# Patient Record
Sex: MALE | URBAN - METROPOLITAN AREA
[De-identification: names, ages, dates, MRNs, and addresses within clinical notes are randomized per-mention and may not be internally consistent; named-entity substitution may affect disease eponyms.]

---

## 2022-05-27 ENCOUNTER — APPOINTMENT (RX ONLY)
Dept: URBAN - METROPOLITAN AREA CLINIC 164 | Facility: CLINIC | Age: 67
Setting detail: DERMATOLOGY
End: 2022-05-27

## 2022-05-27 DIAGNOSIS — L82.1 OTHER SEBORRHEIC KERATOSIS: ICD-10-CM

## 2022-05-27 DIAGNOSIS — D18.0 HEMANGIOMA: ICD-10-CM

## 2022-05-27 DIAGNOSIS — D22 MELANOCYTIC NEVI: ICD-10-CM

## 2022-05-27 DIAGNOSIS — L57.0 ACTINIC KERATOSIS: ICD-10-CM

## 2022-05-27 DIAGNOSIS — L81.4 OTHER MELANIN HYPERPIGMENTATION: ICD-10-CM

## 2022-05-27 DIAGNOSIS — L57.8 OTHER SKIN CHANGES DUE TO CHRONIC EXPOSURE TO NONIONIZING RADIATION: ICD-10-CM

## 2022-05-27 PROBLEM — D18.01 HEMANGIOMA OF SKIN AND SUBCUTANEOUS TISSUE: Status: ACTIVE | Noted: 2022-05-27

## 2022-05-27 PROBLEM — D22.5 MELANOCYTIC NEVI OF TRUNK: Status: ACTIVE | Noted: 2022-05-27

## 2022-05-27 PROBLEM — D48.5 NEOPLASM OF UNCERTAIN BEHAVIOR OF SKIN: Status: ACTIVE | Noted: 2022-05-27

## 2022-05-27 PROCEDURE — 17000 DESTRUCT PREMALG LESION: CPT | Mod: 59

## 2022-05-27 PROCEDURE — ? FULL BODY SKIN EXAM

## 2022-05-27 PROCEDURE — 17003 DESTRUCT PREMALG LES 2-14: CPT

## 2022-05-27 PROCEDURE — ? COUNSELING

## 2022-05-27 PROCEDURE — 99203 OFFICE O/P NEW LOW 30 MIN: CPT | Mod: 25

## 2022-05-27 PROCEDURE — 11102 TANGNTL BX SKIN SINGLE LES: CPT

## 2022-05-27 PROCEDURE — ? BIOPSY BY SHAVE METHOD

## 2022-05-27 PROCEDURE — ? LIQUID NITROGEN

## 2022-05-27 PROCEDURE — ? SUNSCREEN RECOMMENDATIONS

## 2022-05-27 ASSESSMENT — LOCATION SIMPLE DESCRIPTION DERM
LOCATION SIMPLE: LOWER BACK
LOCATION SIMPLE: RIGHT LOWER BACK
LOCATION SIMPLE: RIGHT EAR

## 2022-05-27 ASSESSMENT — LOCATION DETAILED DESCRIPTION DERM
LOCATION DETAILED: SUPERIOR LUMBAR SPINE
LOCATION DETAILED: RIGHT SUPERIOR MEDIAL MIDBACK
LOCATION DETAILED: RIGHT SUPERIOR CRUS OF ANTIHELIX
LOCATION DETAILED: RIGHT SUPERIOR HELIX

## 2022-05-27 ASSESSMENT — LOCATION ZONE DERM
LOCATION ZONE: TRUNK
LOCATION ZONE: EAR

## 2022-05-27 NOTE — PROCEDURE: SUNSCREEN RECOMMENDATIONS
[facility]  Critical Care - Medicine  Consult Note    Patient Name: Zhao Fischer  MRN: [id number]  Admission Date: 3/21/2020  Hospital Length of Stay: 2 days  Code Status: Full Code  Attending Physician: Rafaela Romero*  Primary Care Provider: Mannie Russell MD   Principal Problem: Pneumonia due to Severe Acute Respiratory Syndrome Coronavirus 2 (SARS-Cov-2)    Consults  Subjective:     Interval History:  Overnight the patient remained on fentanyl and propofol.  He remains on similar ventilator settings, but mildly increased.  He is having worsening renal function.  He is COVID +.  He was also hypoglycemic overnight, presumably secondary to hydroxychloroquine.  He is tolerating tube feeds at this time.    Past Medical History:   Diagnosis Date    Diabetes mellitus, type 2     Hyperlipidemia     Hypertension     Peripheral artery disease        Past Surgical History:   Procedure Laterality Date    ANGIOPLASTY      CARDIAC CATHETERIZATION         Review of patient's allergies indicates:   Allergen Reactions    Clindamycin Diarrhea       Family History     Problem Relation (Age of Onset)    Hypertension Father    No Known Problems Mother    Stroke Father        Tobacco Use    Smoking status: Former Smoker   Substance and Sexual Activity    Alcohol use: Yes     Comment: social    Drug use: Not on file    Sexual activity: Not on file      Review of Systems  Objective:     Vital Signs (Most Recent):  Temp: 97.5 °F (36.4 °C) (03/25/20 1215)  Pulse: 94 (03/25/20 1330)  Resp: (!) 42 (03/25/20 1330)  BP: 115/70 (03/25/20 1330)  SpO2: (!) 94 % (03/25/20 1330) Vital Signs (24h Range):  Temp:  [96.2 °F (35.7 °C)-98.8 °F (37.1 °C)] 97.5 °F (36.4 °C)  Pulse:  [] 94  Resp:  [18-47] 42  SpO2:  [88 %-97 %] 94 %  BP: ()/(54-79) 115/70     Weight: 73.9 kg (162 lb 14.7 oz)  Body mass index is 24.77 kg/m².      Intake/Output Summary (Last 24 hours) at 3/25/2020 1509  Last data filed at 
3/25/2020 1300  Gross per 24 hour   Intake 1849.21 ml   Output 281 ml   Net 1568.21 ml       Physical Exam   Constitutional: He is oriented to person, place, and time. He appears well-developed and well-nourished. No distress.   HENT:   Head: Normocephalic and atraumatic.   Mouth/Throat: No oropharyngeal exudate.   Eyes: Pupils are equal, round, and reactive to light. No scleral icterus.   Neck: Normal range of motion. Neck supple.   Cardiovascular: Normal rate and regular rhythm. Exam reveals no gallop and no friction rub.   No murmur heard.  Pulmonary/Chest: He is in respiratory distress. He has no wheezes. He has rales. He exhibits no tenderness.   Increased WOB, diffuse crackles present bilaterally   Abdominal: Soft. Bowel sounds are normal. He exhibits no distension. There is no tenderness.   Musculoskeletal: Normal range of motion. He exhibits no edema.   Neurological: He is alert and oriented to person, place, and time. No cranial nerve deficit.   Skin: Skin is warm and dry. He is not diaphoretic.   Psychiatric: He has a normal mood and affect. His behavior is normal. Judgment and thought content normal.       Vents:  Vent Mode: A/C (03/25/20 1020)  Set Rate: 18 BPM (03/25/20 1020)  Vt Set: 420 mL (03/25/20 1020)  Pressure Support: 0 cmH20 (03/25/20 1020)  PEEP/CPAP: 10 cmH20 (03/25/20 1020)  Oxygen Concentration (%): 65 (03/25/20 1330)  Peak Airway Pressure: 23 cmH2O (03/25/20 1020)  Plateau Pressure: 0 cmH20 (03/25/20 1020)  Total Ve: 9.51 mL (03/25/20 1020)  F/VT Ratio<105 (RSBI): (!) 63.1 (03/25/20 1020)    Lines/Drains/Airways     Drain                 NG/OG Tube 03/23/20 1157 orogastric Center mouth 2 days         Urethral Catheter 03/23/20 1206 2 days          Airway                 Airway - Non-Surgical 03/23/20 1148 Endotracheal Tube 2 days          Peripheral Intravenous Line                 Peripheral IV - Single Lumen 03/23/20 1158 20 G Right Hand 2 days         Peripheral IV - Single Lumen 
03/23/20 1201 18 G Left Forearm 2 days         Peripheral IV - Single Lumen 03/24/20 1600 18 G Posterior;Right Forearm less than 1 day                Significant Labs:    CBC/Anemia Profile:  Recent Labs   Lab 03/24/20  0646 03/25/20  0510   WBC 6.84 6.20   HGB 15.9 15.2   HCT 47.4 45.7    195   MCV 91 92   RDW 13.0 13.0        Chemistries:  Recent Labs   Lab 03/24/20  0646 03/25/20  0510   * 131*   K 4.7 4.1    102   CO2 18* 19*   BUN 54* 72*   CREATININE 2.0* 2.7*   CALCIUM 8.1* 7.9*   ALBUMIN 2.3* 2.2*   PROT 5.5* 5.3*   BILITOT 0.4 0.3   ALKPHOS 68 60   ALT 56* 45*   AST 91* 67*   MG 2.6 2.6   PHOS 5.1* 4.4       All pertinent labs within the past 24 hours have been reviewed.    Significant Imaging: I have reviewed all pertinent imaging results/findings within the past 24 hours.    Assessment/Plan:     Active Diagnoses:    Diagnosis Date Noted POA    PRINCIPAL PROBLEM:  Pneumonia due to Severe Acute Respiratory Syndrome Coronavirus 2 (SARS-Cov-2) [J12.89, B97.29] 03/22/2020 Yes    Goals of care, counseling/discussion [Z71.89] 03/24/2020 Not Applicable    Counseling regarding advance care planning and goals of care [Z71.89] 03/24/2020 Not Applicable    Palliative care encounter [Z51.5] 03/24/2020 Not Applicable    Acute Respiratory Disease due to Covid-19 Virus [J06.9, B97.29] 03/24/2020 Yes    KEYUR (acute kidney injury) [N17.9] 03/24/2020 No    Elevated LFTs [R94.5] 03/24/2020 Yes    Cough [R05] 03/23/2020 Yes    Acute Respiratory Distress Syndrome (ARDS) due to Severe Acute Respiratory Syndrome Coronavirus 2 (SARS-Cov-2) [J80, B97.29] 03/22/2020 Yes    Acute respiratory failure with hypoxia [J96.01] 03/21/2020 Yes    Essential hypertension [I10] 01/09/2020 Yes     Chronic    Hyperlipidemia, mixed [E78.2] 01/09/2020 Yes     Chronic    PAD (peripheral artery disease) [I73.9] 01/09/2020 Yes     Chronic    Type 2 diabetes mellitus with diabetic peripheral angiopathy without gangrene, 
without long-term current use of insulin [E11.51] 01/09/2020 Yes     Chronic    ASCVD (arteriosclerotic cardiovascular disease) [I25.10] 01/09/2020 Yes     Chronic      Problems Resolved During this Admission:       CNS  # need for sedation  - continue fentanyl and propofol gtt  - daily sat/sbt as indicated    PULM  # COVID 19 rule out  # bilateral pneumonia  # hypoxemic respiratory failure  # ARDS  - continue low tidal volume ventilation  - continue azithromycin x 5 days.  Plaquenil d/c due to hypoglycemia  - titrate vent according to the ARDSnet protocol to keep sats >90%  - ensure net negative fluid balance.  - previously on ceftriaxone for CAP coverage, now discontinued.    CARDS  # HTN  # HLD  # PAD  # NSTEMI  - hold amlodipine, lisinopril while sedated.  Resume if hypertension occurs.  - continue statin, plavix, fenofibrate  - troponins down-trending now.    GI/FEN  # need for tube feeds  - will start tube feeds today.    RENAL  # KEYUR  # metabolic acidosis  # hyperphosphatemia  - daily labs to monitor for renal function.  If UOP decreases, he will need placement of HD catheter and involvement of nephrology.  - he is actually seemingly volume down at this time.  We will administer a 500cc bolus to evaluate for improvement in renal output and function. Also may aid in hypotension.    ID  # COVID 19 rule out  - continue therapy as above.    HEME  # no acute issues at this time.    Endocrine  # hypoglycemia  - presumably secondary to hydroxychloroquine.  Will stop at this time  - continue tube feeds.  - continue d10 for now and wean as tolerated.        Thank you for your consult. I will follow-up with patient. Please contact us if you have any additional questions.     Nikolai Guevara MD  Critical Care - Medicine  [facility]  
Detail Level: Generalized
General Sunscreen Counseling: I recommended a broad spectrum sunscreen with a SPF of 30 or higher.  I explained that SPF 30 sunscreens block approximately 97 percent of the sun's harmful rays.  Sunscreens should be applied at least 15 minutes prior to expected sun exposure and then every 2 hours after that as long as sun exposure continues. If swimming or exercising sunscreen should be reapplied every 45 minutes to an hour after getting wet or sweating.  One ounce, or the equivalent of a shot glass full of sunscreen, is adequate to protect the skin not covered by a bathing suit. I also recommended a lip balm with a sunscreen as well. Sun protective clothing can be used in lieu of sunscreen but must be worn the entire time you are exposed to the sun's rays.